# Patient Record
Sex: MALE | Race: WHITE | NOT HISPANIC OR LATINO
[De-identification: names, ages, dates, MRNs, and addresses within clinical notes are randomized per-mention and may not be internally consistent; named-entity substitution may affect disease eponyms.]

---

## 2017-02-02 ENCOUNTER — MESSAGE (OUTPATIENT)
Age: 82
End: 2017-02-02

## 2017-02-03 ENCOUNTER — MESSAGE (OUTPATIENT)
Age: 82
End: 2017-02-03

## 2017-03-24 ENCOUNTER — RX RENEWAL (OUTPATIENT)
Age: 82
End: 2017-03-24

## 2017-03-25 ENCOUNTER — RX RENEWAL (OUTPATIENT)
Age: 82
End: 2017-03-25

## 2017-05-08 ENCOUNTER — OTHER (OUTPATIENT)
Age: 82
End: 2017-05-08

## 2017-05-09 ENCOUNTER — APPOINTMENT (OUTPATIENT)
Dept: UROLOGY | Facility: CLINIC | Age: 82
End: 2017-05-09

## 2017-05-09 DIAGNOSIS — R33.9 RETENTION OF URINE, UNSPECIFIED: ICD-10-CM

## 2017-05-10 LAB
ALBUMIN SERPL ELPH-MCNC: 4.4 G/DL
ALP BLD-CCNC: 69 U/L
ALT SERPL-CCNC: 16 U/L
ANION GAP SERPL CALC-SCNC: 14 MMOL/L
AST SERPL-CCNC: 20 U/L
BASOPHILS # BLD AUTO: 0.02 K/UL
BASOPHILS NFR BLD AUTO: 0.2 %
BILIRUB SERPL-MCNC: 0.3 MG/DL
BUN SERPL-MCNC: 23 MG/DL
CALCIUM SERPL-MCNC: 9.5 MG/DL
CHLORIDE SERPL-SCNC: 104 MMOL/L
CO2 SERPL-SCNC: 24 MMOL/L
CREAT SERPL-MCNC: 1.09 MG/DL
EOSINOPHIL # BLD AUTO: 0.16 K/UL
EOSINOPHIL NFR BLD AUTO: 2 %
GLUCOSE SERPL-MCNC: 92 MG/DL
HCT VFR BLD CALC: 39.6 %
HGB BLD-MCNC: 13 G/DL
IMM GRANULOCYTES NFR BLD AUTO: 0.1 %
LYMPHOCYTES # BLD AUTO: 2.16 K/UL
LYMPHOCYTES NFR BLD AUTO: 26.8 %
MAN DIFF?: NORMAL
MCHC RBC-ENTMCNC: 28.8 PG
MCHC RBC-ENTMCNC: 32.8 GM/DL
MCV RBC AUTO: 87.6 FL
MONOCYTES # BLD AUTO: 0.61 K/UL
MONOCYTES NFR BLD AUTO: 7.6 %
NEUTROPHILS # BLD AUTO: 5.1 K/UL
NEUTROPHILS NFR BLD AUTO: 63.3 %
PLATELET # BLD AUTO: 151 K/UL
POTASSIUM SERPL-SCNC: 4.3 MMOL/L
PROT SERPL-MCNC: 6.6 G/DL
PSA SERPL-MCNC: 0.96 NG/ML
RBC # BLD: 4.52 M/UL
RBC # FLD: 14.6 %
SODIUM SERPL-SCNC: 142 MMOL/L
TESTOST SERPL-MCNC: 485 NG/DL
WBC # FLD AUTO: 8.06 K/UL

## 2017-05-21 ENCOUNTER — FORM ENCOUNTER (OUTPATIENT)
Age: 82
End: 2017-05-21

## 2017-05-22 ENCOUNTER — APPOINTMENT (OUTPATIENT)
Dept: ULTRASOUND IMAGING | Facility: CLINIC | Age: 82
End: 2017-05-22

## 2017-05-22 ENCOUNTER — MESSAGE (OUTPATIENT)
Age: 82
End: 2017-05-22

## 2017-05-22 ENCOUNTER — OUTPATIENT (OUTPATIENT)
Dept: OUTPATIENT SERVICES | Facility: HOSPITAL | Age: 82
LOS: 1 days | End: 2017-05-22
Payer: MEDICARE

## 2017-05-22 DIAGNOSIS — N18.3 CHRONIC KIDNEY DISEASE, STAGE 3 (MODERATE): ICD-10-CM

## 2017-05-22 PROCEDURE — 76536 US EXAM OF HEAD AND NECK: CPT

## 2017-06-07 ENCOUNTER — APPOINTMENT (OUTPATIENT)
Dept: INTERNAL MEDICINE | Facility: CLINIC | Age: 82
End: 2017-06-07

## 2017-06-07 VITALS
TEMPERATURE: 97.9 F | OXYGEN SATURATION: 96 % | HEART RATE: 59 BPM | SYSTOLIC BLOOD PRESSURE: 132 MMHG | WEIGHT: 189 LBS | DIASTOLIC BLOOD PRESSURE: 80 MMHG | BODY MASS INDEX: 25.6 KG/M2 | RESPIRATION RATE: 17 BRPM | HEIGHT: 72 IN

## 2017-06-07 RX ORDER — CEPHALEXIN 500 MG/1
500 CAPSULE ORAL
Qty: 14 | Refills: 0 | Status: COMPLETED | COMMUNITY
Start: 2017-03-10

## 2017-06-08 LAB
ALBUMIN SERPL ELPH-MCNC: 4.3 G/DL
ALP BLD-CCNC: 73 U/L
ALT SERPL-CCNC: 17 U/L
ANION GAP SERPL CALC-SCNC: 11 MMOL/L
AST SERPL-CCNC: 17 U/L
BILIRUB SERPL-MCNC: 0.6 MG/DL
BUN SERPL-MCNC: 26 MG/DL
CALCIUM SERPL-MCNC: 9.8 MG/DL
CHLORIDE SERPL-SCNC: 105 MMOL/L
CHOLEST SERPL-MCNC: 153 MG/DL
CHOLEST/HDLC SERPL: 3.5 RATIO
CK SERPL-CCNC: 52 U/L
CO2 SERPL-SCNC: 24 MMOL/L
CREAT SERPL-MCNC: 1.24 MG/DL
GLUCOSE SERPL-MCNC: 92 MG/DL
HDLC SERPL-MCNC: 44 MG/DL
LDLC SERPL CALC-MCNC: 92 MG/DL
POTASSIUM SERPL-SCNC: 4 MMOL/L
PROT SERPL-MCNC: 6.8 G/DL
SODIUM SERPL-SCNC: 140 MMOL/L
TRIGL SERPL-MCNC: 86 MG/DL

## 2017-06-14 ENCOUNTER — RX RENEWAL (OUTPATIENT)
Age: 82
End: 2017-06-14

## 2017-09-07 ENCOUNTER — RX RENEWAL (OUTPATIENT)
Age: 82
End: 2017-09-07

## 2017-09-11 ENCOUNTER — RX RENEWAL (OUTPATIENT)
Age: 82
End: 2017-09-11

## 2017-11-29 ENCOUNTER — MESSAGE (OUTPATIENT)
Age: 82
End: 2017-11-29

## 2017-12-11 ENCOUNTER — RX RENEWAL (OUTPATIENT)
Age: 82
End: 2017-12-11

## 2017-12-14 ENCOUNTER — MEDICATION RENEWAL (OUTPATIENT)
Age: 82
End: 2017-12-14

## 2017-12-19 ENCOUNTER — APPOINTMENT (OUTPATIENT)
Dept: UROLOGY | Facility: CLINIC | Age: 82
End: 2017-12-19
Payer: MEDICARE

## 2017-12-19 LAB
ALBUMIN SERPL ELPH-MCNC: 4.3 G/DL
ALP BLD-CCNC: 68 U/L
ALT SERPL-CCNC: 12 U/L
ANION GAP SERPL CALC-SCNC: 13 MMOL/L
APPEARANCE: CLEAR
AST SERPL-CCNC: 18 U/L
BACTERIA: NEGATIVE
BILIRUB SERPL-MCNC: 0.3 MG/DL
BILIRUBIN URINE: NEGATIVE
BLOOD URINE: NEGATIVE
BUN SERPL-MCNC: 27 MG/DL
CALCIUM SERPL-MCNC: 9.3 MG/DL
CHLORIDE SERPL-SCNC: 107 MMOL/L
CO2 SERPL-SCNC: 24 MMOL/L
COLOR: YELLOW
CREAT SERPL-MCNC: 1.14 MG/DL
GLUCOSE QUALITATIVE U: NEGATIVE MG/DL
GLUCOSE SERPL-MCNC: 86 MG/DL
HYALINE CASTS: 2 /LPF
KETONES URINE: NEGATIVE
LEUKOCYTE ESTERASE URINE: NEGATIVE
MICROSCOPIC-UA: NORMAL
NITRITE URINE: NEGATIVE
PH URINE: 5
POTASSIUM SERPL-SCNC: 4.4 MMOL/L
PROT SERPL-MCNC: 6.7 G/DL
PROTEIN URINE: NEGATIVE MG/DL
RED BLOOD CELLS URINE: 3 /HPF
SODIUM SERPL-SCNC: 144 MMOL/L
SPECIFIC GRAVITY URINE: 1.02
SQUAMOUS EPITHELIAL CELLS: 0 /HPF
UROBILINOGEN URINE: NEGATIVE MG/DL
WHITE BLOOD CELLS URINE: 1 /HPF

## 2017-12-19 PROCEDURE — 99214 OFFICE O/P EST MOD 30 MIN: CPT

## 2017-12-21 LAB
BACTERIA UR CULT: NORMAL
BASOPHILS # BLD AUTO: 0.03 K/UL
BASOPHILS NFR BLD AUTO: 0.4 %
CHOLEST SERPL-MCNC: 153 MG/DL
CHOLEST/HDLC SERPL: 3.5 RATIO
CORE LAB FLUID CYTOLOGY: NORMAL
EOSINOPHIL # BLD AUTO: 0.17 K/UL
EOSINOPHIL NFR BLD AUTO: 2.2 %
HCT VFR BLD CALC: 40.5 %
HDLC SERPL-MCNC: 44 MG/DL
HGB BLD-MCNC: 12.7 G/DL
IMM GRANULOCYTES NFR BLD AUTO: 0.3 %
LDLC SERPL CALC-MCNC: 87 MG/DL
LYMPHOCYTES # BLD AUTO: 1.95 K/UL
LYMPHOCYTES NFR BLD AUTO: 24.7 %
MAN DIFF?: NORMAL
MCHC RBC-ENTMCNC: 27.8 PG
MCHC RBC-ENTMCNC: 31.4 GM/DL
MCV RBC AUTO: 88.6 FL
MONOCYTES # BLD AUTO: 0.71 K/UL
MONOCYTES NFR BLD AUTO: 9 %
NEUTROPHILS # BLD AUTO: 5 K/UL
NEUTROPHILS NFR BLD AUTO: 63.4 %
PLATELET # BLD AUTO: 167 K/UL
PSA FREE FLD-MCNC: 27.3
PSA FREE SERPL-MCNC: 0.27 NG/ML
PSA SERPL-MCNC: 0.99 NG/ML
RBC # BLD: 4.57 M/UL
RBC # FLD: 14.9 %
TESTOST SERPL-MCNC: 390.1 NG/DL
TRIGL SERPL-MCNC: 112 MG/DL
WBC # FLD AUTO: 7.88 K/UL

## 2018-01-03 ENCOUNTER — MEDICATION RENEWAL (OUTPATIENT)
Age: 83
End: 2018-01-03

## 2018-01-03 ENCOUNTER — MESSAGE (OUTPATIENT)
Age: 83
End: 2018-01-03

## 2018-03-14 ENCOUNTER — RX RENEWAL (OUTPATIENT)
Age: 83
End: 2018-03-14

## 2018-03-27 ENCOUNTER — MESSAGE (OUTPATIENT)
Age: 83
End: 2018-03-27

## 2018-03-29 ENCOUNTER — MESSAGE (OUTPATIENT)
Age: 83
End: 2018-03-29

## 2018-04-25 ENCOUNTER — APPOINTMENT (OUTPATIENT)
Dept: UROLOGY | Facility: CLINIC | Age: 83
End: 2018-04-25
Payer: MEDICARE

## 2018-04-25 PROCEDURE — 99215 OFFICE O/P EST HI 40 MIN: CPT

## 2018-04-26 LAB
ALBUMIN SERPL ELPH-MCNC: 4.3 G/DL
ALP BLD-CCNC: 73 U/L
ALT SERPL-CCNC: 18 U/L
ANION GAP SERPL CALC-SCNC: 15 MMOL/L
APPEARANCE: CLEAR
AST SERPL-CCNC: 21 U/L
BACTERIA: NEGATIVE
BASOPHILS # BLD AUTO: 0.02 K/UL
BASOPHILS NFR BLD AUTO: 0.3 %
BILIRUB SERPL-MCNC: 0.5 MG/DL
BILIRUBIN URINE: NEGATIVE
BLOOD URINE: ABNORMAL
BUN SERPL-MCNC: 27 MG/DL
CALCIUM SERPL-MCNC: 9.7 MG/DL
CHLORIDE SERPL-SCNC: 104 MMOL/L
CHOLEST SERPL-MCNC: 161 MG/DL
CHOLEST/HDLC SERPL: 3.9 RATIO
CO2 SERPL-SCNC: 24 MMOL/L
COLOR: YELLOW
CREAT SERPL-MCNC: 1.23 MG/DL
EOSINOPHIL # BLD AUTO: 0.12 K/UL
EOSINOPHIL NFR BLD AUTO: 1.7 %
GLUCOSE QUALITATIVE U: NEGATIVE MG/DL
GLUCOSE SERPL-MCNC: 88 MG/DL
HCT VFR BLD CALC: 40.2 %
HDLC SERPL-MCNC: 41 MG/DL
HGB BLD-MCNC: 13.3 G/DL
HYALINE CASTS: 0 /LPF
IMM GRANULOCYTES NFR BLD AUTO: 0.1 %
KETONES URINE: NEGATIVE
LDLC SERPL CALC-MCNC: 89 MG/DL
LEUKOCYTE ESTERASE URINE: NEGATIVE
LYMPHOCYTES # BLD AUTO: 1.55 K/UL
LYMPHOCYTES NFR BLD AUTO: 22.3 %
MAN DIFF?: NORMAL
MCHC RBC-ENTMCNC: 28.7 PG
MCHC RBC-ENTMCNC: 33.1 GM/DL
MCV RBC AUTO: 86.6 FL
MICROSCOPIC-UA: NORMAL
MONOCYTES # BLD AUTO: 0.72 K/UL
MONOCYTES NFR BLD AUTO: 10.4 %
NEUTROPHILS # BLD AUTO: 4.52 K/UL
NEUTROPHILS NFR BLD AUTO: 65.2 %
NITRITE URINE: NEGATIVE
PH URINE: 5.5
PLATELET # BLD AUTO: 138 K/UL
POTASSIUM SERPL-SCNC: 4.8 MMOL/L
PROT SERPL-MCNC: 6.9 G/DL
PROTEIN URINE: NEGATIVE MG/DL
PSA FREE FLD-MCNC: 25.8
PSA FREE SERPL-MCNC: 0.25 NG/ML
PSA SERPL-MCNC: 0.96 NG/ML
PSA SERPL-MCNC: 0.97 NG/ML
RBC # BLD: 4.64 M/UL
RBC # FLD: 14.6 %
RED BLOOD CELLS URINE: 3 /HPF
SODIUM SERPL-SCNC: 143 MMOL/L
SPECIFIC GRAVITY URINE: 1.02
SQUAMOUS EPITHELIAL CELLS: 0 /HPF
TESTOST SERPL-MCNC: 427.3 NG/DL
TRIGL SERPL-MCNC: 157 MG/DL
UROBILINOGEN URINE: NEGATIVE MG/DL
WBC # FLD AUTO: 6.94 K/UL
WHITE BLOOD CELLS URINE: 0 /HPF

## 2018-04-28 LAB
BACTERIA UR CULT: NORMAL
CORE LAB FLUID CYTOLOGY: NORMAL

## 2018-05-08 ENCOUNTER — APPOINTMENT (OUTPATIENT)
Dept: INTERNAL MEDICINE | Facility: CLINIC | Age: 83
End: 2018-05-08
Payer: MEDICARE

## 2018-05-08 VITALS
BODY MASS INDEX: 26.28 KG/M2 | HEART RATE: 81 BPM | TEMPERATURE: 97.9 F | SYSTOLIC BLOOD PRESSURE: 119 MMHG | RESPIRATION RATE: 16 BRPM | HEIGHT: 72 IN | DIASTOLIC BLOOD PRESSURE: 74 MMHG | OXYGEN SATURATION: 97 % | WEIGHT: 194 LBS

## 2018-05-08 DIAGNOSIS — Z00.00 ENCOUNTER FOR GENERAL ADULT MEDICAL EXAMINATION W/OUT ABNORMAL FINDINGS: ICD-10-CM

## 2018-05-08 PROCEDURE — G0439: CPT

## 2018-05-17 LAB
ALBUMIN SERPL ELPH-MCNC: 4.1 G/DL
ALP BLD-CCNC: 70 U/L
ALT SERPL-CCNC: 20 U/L
ANION GAP SERPL CALC-SCNC: 11 MMOL/L
APPEARANCE: CLEAR
AST SERPL-CCNC: 21 U/L
BASOPHILS # BLD AUTO: 0.03 K/UL
BASOPHILS NFR BLD AUTO: 0.4 %
BILIRUB SERPL-MCNC: 0.5 MG/DL
BILIRUBIN URINE: NEGATIVE
BLOOD URINE: NEGATIVE
BUN SERPL-MCNC: 26 MG/DL
CALCIUM SERPL-MCNC: 9.8 MG/DL
CHLORIDE SERPL-SCNC: 108 MMOL/L
CHOLEST SERPL-MCNC: 158 MG/DL
CHOLEST/HDLC SERPL: 3.5 RATIO
CO2 SERPL-SCNC: 24 MMOL/L
COLOR: YELLOW
CREAT SERPL-MCNC: 1.21 MG/DL
EOSINOPHIL # BLD AUTO: 0.14 K/UL
EOSINOPHIL NFR BLD AUTO: 1.9 %
GLUCOSE QUALITATIVE U: NEGATIVE MG/DL
GLUCOSE SERPL-MCNC: 94 MG/DL
HBA1C MFR BLD HPLC: 5.4 %
HCT VFR BLD CALC: 40.2 %
HCV AB SER QL: NONREACTIVE
HCV S/CO RATIO: 0.09 S/CO
HDLC SERPL-MCNC: 45 MG/DL
HGB BLD-MCNC: 12.9 G/DL
IMM GRANULOCYTES NFR BLD AUTO: 0.1 %
KETONES URINE: NEGATIVE
LDLC SERPL CALC-MCNC: 93 MG/DL
LEUKOCYTE ESTERASE URINE: NEGATIVE
LYMPHOCYTES # BLD AUTO: 2.3 K/UL
LYMPHOCYTES NFR BLD AUTO: 31.8 %
MAN DIFF?: NORMAL
MCHC RBC-ENTMCNC: 27.8 PG
MCHC RBC-ENTMCNC: 32.1 GM/DL
MCV RBC AUTO: 86.6 FL
MONOCYTES # BLD AUTO: 0.55 K/UL
MONOCYTES NFR BLD AUTO: 7.6 %
NEUTROPHILS # BLD AUTO: 4.21 K/UL
NEUTROPHILS NFR BLD AUTO: 58.2 %
NITRITE URINE: NEGATIVE
PH URINE: 5.5
PLATELET # BLD AUTO: 154 K/UL
POTASSIUM SERPL-SCNC: 4.3 MMOL/L
PROT SERPL-MCNC: 6.6 G/DL
PROTEIN URINE: NEGATIVE MG/DL
RBC # BLD: 4.64 M/UL
RBC # FLD: 14.9 %
SODIUM SERPL-SCNC: 143 MMOL/L
SPECIFIC GRAVITY URINE: 1.02
TRIGL SERPL-MCNC: 100 MG/DL
TSH SERPL-ACNC: 6.82 UIU/ML
UROBILINOGEN URINE: NEGATIVE MG/DL
WBC # FLD AUTO: 7.24 K/UL

## 2018-06-12 ENCOUNTER — RX RENEWAL (OUTPATIENT)
Age: 83
End: 2018-06-12

## 2018-06-22 ENCOUNTER — RX RENEWAL (OUTPATIENT)
Age: 83
End: 2018-06-22

## 2018-09-18 ENCOUNTER — RX RENEWAL (OUTPATIENT)
Age: 83
End: 2018-09-18

## 2018-09-21 ENCOUNTER — RX RENEWAL (OUTPATIENT)
Age: 83
End: 2018-09-21

## 2018-10-12 ENCOUNTER — MESSAGE (OUTPATIENT)
Age: 83
End: 2018-10-12

## 2018-10-31 ENCOUNTER — APPOINTMENT (OUTPATIENT)
Dept: UROLOGY | Facility: CLINIC | Age: 83
End: 2018-10-31
Payer: MEDICARE

## 2018-10-31 DIAGNOSIS — R36.1 HEMATOSPERMIA: ICD-10-CM

## 2018-10-31 DIAGNOSIS — R35.0 FREQUENCY OF MICTURITION: ICD-10-CM

## 2018-10-31 PROCEDURE — 99214 OFFICE O/P EST MOD 30 MIN: CPT

## 2018-11-01 ENCOUNTER — APPOINTMENT (OUTPATIENT)
Dept: UROLOGY | Facility: CLINIC | Age: 83
End: 2018-11-01

## 2018-11-01 LAB
APPEARANCE: CLEAR
BACTERIA: NEGATIVE
BILIRUBIN URINE: NEGATIVE
BLOOD URINE: ABNORMAL
COLOR: YELLOW
GLUCOSE QUALITATIVE U: NEGATIVE MG/DL
HYALINE CASTS: 0 /LPF
KETONES URINE: NEGATIVE
LEUKOCYTE ESTERASE URINE: NEGATIVE
MICROSCOPIC-UA: NORMAL
NITRITE URINE: NEGATIVE
PH URINE: 5.5
PROTEIN URINE: NEGATIVE MG/DL
RED BLOOD CELLS URINE: 1 /HPF
SPECIFIC GRAVITY URINE: 1.02
SQUAMOUS EPITHELIAL CELLS: 0 /HPF
UROBILINOGEN URINE: NEGATIVE MG/DL
WHITE BLOOD CELLS URINE: 1 /HPF

## 2018-11-02 LAB — BACTERIA UR CULT: NORMAL

## 2018-11-03 LAB — CORE LAB FLUID CYTOLOGY: NORMAL

## 2018-11-27 ENCOUNTER — MESSAGE (OUTPATIENT)
Age: 83
End: 2018-11-27

## 2018-12-10 ENCOUNTER — MEDICATION RENEWAL (OUTPATIENT)
Age: 83
End: 2018-12-10

## 2018-12-10 ENCOUNTER — RX RENEWAL (OUTPATIENT)
Age: 83
End: 2018-12-10

## 2018-12-13 ENCOUNTER — MOBILE ON CALL (OUTPATIENT)
Age: 83
End: 2018-12-13

## 2018-12-13 ENCOUNTER — FORM ENCOUNTER (OUTPATIENT)
Age: 83
End: 2018-12-13

## 2018-12-14 ENCOUNTER — APPOINTMENT (OUTPATIENT)
Dept: UROLOGY | Facility: CLINIC | Age: 83
End: 2018-12-14
Payer: MEDICARE

## 2018-12-14 ENCOUNTER — OUTPATIENT (OUTPATIENT)
Dept: OUTPATIENT SERVICES | Facility: HOSPITAL | Age: 83
LOS: 1 days | End: 2018-12-14
Payer: MEDICARE

## 2018-12-14 ENCOUNTER — APPOINTMENT (OUTPATIENT)
Dept: CT IMAGING | Facility: IMAGING CENTER | Age: 83
End: 2018-12-14
Payer: MEDICARE

## 2018-12-14 VITALS
TEMPERATURE: 97.9 F | RESPIRATION RATE: 18 BRPM | HEART RATE: 80 BPM | SYSTOLIC BLOOD PRESSURE: 131 MMHG | DIASTOLIC BLOOD PRESSURE: 81 MMHG

## 2018-12-14 DIAGNOSIS — N40.1 BENIGN PROSTATIC HYPERPLASIA WITH LOWER URINARY TRACT SYMPTOMS: ICD-10-CM

## 2018-12-14 PROCEDURE — 99213 OFFICE O/P EST LOW 20 MIN: CPT

## 2018-12-14 PROCEDURE — 74178 CT ABD&PLV WO CNTR FLWD CNTR: CPT | Mod: 26

## 2018-12-14 PROCEDURE — 74178 CT ABD&PLV WO CNTR FLWD CNTR: CPT

## 2018-12-14 PROCEDURE — 82565 ASSAY OF CREATININE: CPT

## 2018-12-17 ENCOUNTER — APPOINTMENT (OUTPATIENT)
Dept: SURGICAL ONCOLOGY | Facility: CLINIC | Age: 83
End: 2018-12-17
Payer: MEDICARE

## 2018-12-17 ENCOUNTER — OUTPATIENT (OUTPATIENT)
Dept: OUTPATIENT SERVICES | Facility: HOSPITAL | Age: 83
LOS: 1 days | End: 2018-12-17
Payer: MEDICARE

## 2018-12-17 ENCOUNTER — APPOINTMENT (OUTPATIENT)
Dept: UROLOGY | Facility: CLINIC | Age: 83
End: 2018-12-17
Payer: MEDICARE

## 2018-12-17 VITALS
SYSTOLIC BLOOD PRESSURE: 170 MMHG | BODY MASS INDEX: 24.52 KG/M2 | HEIGHT: 72 IN | DIASTOLIC BLOOD PRESSURE: 83 MMHG | RESPIRATION RATE: 16 BRPM | HEART RATE: 66 BPM | WEIGHT: 181 LBS | OXYGEN SATURATION: 96 %

## 2018-12-17 VITALS
DIASTOLIC BLOOD PRESSURE: 84 MMHG | RESPIRATION RATE: 17 BRPM | HEART RATE: 76 BPM | SYSTOLIC BLOOD PRESSURE: 144 MMHG | TEMPERATURE: 97.9 F

## 2018-12-17 DIAGNOSIS — R35.0 FREQUENCY OF MICTURITION: ICD-10-CM

## 2018-12-17 DIAGNOSIS — Z12.11 ENCOUNTER FOR SCREENING FOR MALIGNANT NEOPLASM OF COLON: ICD-10-CM

## 2018-12-17 PROCEDURE — 52000 CYSTOURETHROSCOPY: CPT

## 2018-12-17 PROCEDURE — 99213 OFFICE O/P EST LOW 20 MIN: CPT

## 2018-12-18 LAB
APPEARANCE: ABNORMAL
BACTERIA UR CULT: NORMAL
BACTERIA: NEGATIVE
BILIRUBIN URINE: NEGATIVE
BLOOD URINE: ABNORMAL
COLOR: ABNORMAL
GLUCOSE QUALITATIVE U: NEGATIVE MG/DL
HYALINE CASTS: 1 /LPF
KETONES URINE: NEGATIVE
LEUKOCYTE ESTERASE URINE: ABNORMAL
MICROSCOPIC-UA: NORMAL
NITRITE URINE: NEGATIVE
PH URINE: 5.5
PROTEIN URINE: 100 MG/DL
PSA SERPL-MCNC: 0.89 NG/ML
RED BLOOD CELLS URINE: >720 /HPF
SPECIFIC GRAVITY URINE: 1.02
SQUAMOUS EPITHELIAL CELLS: 0 /HPF
UROBILINOGEN URINE: NEGATIVE MG/DL
WHITE BLOOD CELLS URINE: 4 /HPF

## 2018-12-19 ENCOUNTER — APPOINTMENT (OUTPATIENT)
Dept: UROLOGY | Facility: CLINIC | Age: 83
End: 2018-12-19
Payer: MEDICARE

## 2018-12-19 DIAGNOSIS — R31.0 GROSS HEMATURIA: ICD-10-CM

## 2018-12-19 DIAGNOSIS — N40.1 BENIGN PROSTATIC HYPERPLASIA WITH LOWER URINARY TRACT SYMPTOMS: ICD-10-CM

## 2018-12-19 PROCEDURE — 51798 US URINE CAPACITY MEASURE: CPT

## 2018-12-19 PROCEDURE — 99213 OFFICE O/P EST LOW 20 MIN: CPT | Mod: 25

## 2018-12-26 ENCOUNTER — OUTPATIENT (OUTPATIENT)
Dept: OUTPATIENT SERVICES | Facility: HOSPITAL | Age: 83
LOS: 1 days | End: 2018-12-26
Payer: MEDICARE

## 2018-12-26 VITALS
HEART RATE: 62 BPM | RESPIRATION RATE: 16 BRPM | WEIGHT: 177.69 LBS | TEMPERATURE: 97 F | OXYGEN SATURATION: 98 % | SYSTOLIC BLOOD PRESSURE: 131 MMHG | DIASTOLIC BLOOD PRESSURE: 78 MMHG | HEIGHT: 72 IN

## 2018-12-26 DIAGNOSIS — N40.0 BENIGN PROSTATIC HYPERPLASIA WITHOUT LOWER URINARY TRACT SYMPTOMS: ICD-10-CM

## 2018-12-26 DIAGNOSIS — Z98.890 OTHER SPECIFIED POSTPROCEDURAL STATES: Chronic | ICD-10-CM

## 2018-12-26 DIAGNOSIS — Z98.49 CATARACT EXTRACTION STATUS, UNSPECIFIED EYE: Chronic | ICD-10-CM

## 2018-12-26 DIAGNOSIS — T14.8XXA OTHER INJURY OF UNSPECIFIED BODY REGION, INITIAL ENCOUNTER: Chronic | ICD-10-CM

## 2018-12-26 DIAGNOSIS — N40.1 BENIGN PROSTATIC HYPERPLASIA WITH LOWER URINARY TRACT SYMPTOMS: ICD-10-CM

## 2018-12-26 DIAGNOSIS — Z94.7 CORNEAL TRANSPLANT STATUS: Chronic | ICD-10-CM

## 2018-12-26 DIAGNOSIS — Z01.818 ENCOUNTER FOR OTHER PREPROCEDURAL EXAMINATION: ICD-10-CM

## 2018-12-26 DIAGNOSIS — Z90.49 ACQUIRED ABSENCE OF OTHER SPECIFIED PARTS OF DIGESTIVE TRACT: Chronic | ICD-10-CM

## 2018-12-26 DIAGNOSIS — Z90.89 ACQUIRED ABSENCE OF OTHER ORGANS: Chronic | ICD-10-CM

## 2018-12-26 LAB
ANION GAP SERPL CALC-SCNC: 11 MMOL/L — SIGNIFICANT CHANGE UP (ref 5–17)
BLD GP AB SCN SERPL QL: NEGATIVE — SIGNIFICANT CHANGE UP
BUN SERPL-MCNC: 22 MG/DL — SIGNIFICANT CHANGE UP (ref 7–23)
CALCIUM SERPL-MCNC: 9.9 MG/DL — SIGNIFICANT CHANGE UP (ref 8.4–10.5)
CHLORIDE SERPL-SCNC: 104 MMOL/L — SIGNIFICANT CHANGE UP (ref 96–108)
CO2 SERPL-SCNC: 26 MMOL/L — SIGNIFICANT CHANGE UP (ref 22–31)
CREAT SERPL-MCNC: 1.21 MG/DL — SIGNIFICANT CHANGE UP (ref 0.5–1.3)
GLUCOSE SERPL-MCNC: 92 MG/DL — SIGNIFICANT CHANGE UP (ref 70–99)
HCT VFR BLD CALC: 38.7 % — LOW (ref 39–50)
HGB BLD-MCNC: 12 G/DL — LOW (ref 13–17)
MCHC RBC-ENTMCNC: 27.6 PG — SIGNIFICANT CHANGE UP (ref 27–34)
MCHC RBC-ENTMCNC: 31 GM/DL — LOW (ref 32–36)
MCV RBC AUTO: 89.2 FL — SIGNIFICANT CHANGE UP (ref 80–100)
PLATELET # BLD AUTO: 157 K/UL — SIGNIFICANT CHANGE UP (ref 150–400)
POTASSIUM SERPL-MCNC: 3.8 MMOL/L — SIGNIFICANT CHANGE UP (ref 3.5–5.3)
POTASSIUM SERPL-SCNC: 3.8 MMOL/L — SIGNIFICANT CHANGE UP (ref 3.5–5.3)
RBC # BLD: 4.34 M/UL — SIGNIFICANT CHANGE UP (ref 4.2–5.8)
RBC # FLD: 14.8 % — HIGH (ref 10.3–14.5)
RH IG SCN BLD-IMP: POSITIVE — SIGNIFICANT CHANGE UP
SODIUM SERPL-SCNC: 141 MMOL/L — SIGNIFICANT CHANGE UP (ref 135–145)
WBC # BLD: 7.13 K/UL — SIGNIFICANT CHANGE UP (ref 3.8–10.5)
WBC # FLD AUTO: 7.13 K/UL — SIGNIFICANT CHANGE UP (ref 3.8–10.5)

## 2018-12-26 PROCEDURE — 86850 RBC ANTIBODY SCREEN: CPT

## 2018-12-26 PROCEDURE — 86901 BLOOD TYPING SEROLOGIC RH(D): CPT

## 2018-12-26 PROCEDURE — G0463: CPT

## 2018-12-26 PROCEDURE — 85027 COMPLETE CBC AUTOMATED: CPT

## 2018-12-26 PROCEDURE — 86900 BLOOD TYPING SEROLOGIC ABO: CPT

## 2018-12-26 PROCEDURE — 80048 BASIC METABOLIC PNL TOTAL CA: CPT

## 2018-12-26 RX ORDER — CEFAZOLIN SODIUM 1 G
2000 VIAL (EA) INJECTION ONCE
Qty: 0 | Refills: 0 | Status: DISCONTINUED | OUTPATIENT
Start: 2019-01-03 | End: 2019-01-03

## 2018-12-26 RX ORDER — LIDOCAINE HCL 20 MG/ML
0.2 VIAL (ML) INJECTION ONCE
Qty: 0 | Refills: 0 | Status: DISCONTINUED | OUTPATIENT
Start: 2019-01-03 | End: 2019-01-03

## 2018-12-26 RX ORDER — SODIUM CHLORIDE 9 MG/ML
3 INJECTION INTRAMUSCULAR; INTRAVENOUS; SUBCUTANEOUS EVERY 8 HOURS
Qty: 0 | Refills: 0 | Status: DISCONTINUED | OUTPATIENT
Start: 2019-01-03 | End: 2019-01-03

## 2018-12-26 NOTE — H&P PST ADULT - ACTIVITY
Plays golf 3 times a week (lives in [place]), gym 3 times a week - treadmill for 20 mts. Plays golf 3 times a week (lives in [place]), gym 2 times a week - treadmill for 20 mts.

## 2018-12-26 NOTE — H&P PST ADULT - PROBLEM SELECTOR PLAN 1
Laser Enucleation of Prostate, Possible Transurethral Resection of Bladder Tumor  Pre-op education provided - all questions answered

## 2018-12-26 NOTE — H&P PST ADULT - PSH
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

## 2018-12-26 NOTE — H&P PST ADULT - PMH
BPH (benign prostatic hyperplasia)    Colon cancer  s/p colon resection 1988  Cornea disorder  s/p bilateral partial transplant 2014  Heart murmur  benign - no symptoms  HLD (hyperlipidemia) BPH (benign prostatic hyperplasia)    Colon cancer  s/p colon resection 1988  Fuchs' corneal dystrophy    Heart murmur  benign - no symptoms  HLD (hyperlipidemia)

## 2018-12-26 NOTE — H&P PST ADULT - HISTORY OF PRESENT ILLNESS
85 yo male, PMH HLD, colon CA s/p colon resection 1988, fuchs dystrophy s/p partial corneal transplant 2014. Pt. reports hematuria on 12/12/18, saw urologist, in-office cystoscopy revealed enlarged prostate. Pt. presents to PST today for scheduled Laser Enucleation of Prostate, Possible transurethral resection of bladder tumor on 1/3/19. Pt. continues with urinary frequency's denies any further hematuria, fever, chills, chest pain, SOB, changes in bowel habits.  Urine culture from 12/14/18 in chart - <10,000 normal urogenital riddhi

## 2018-12-27 PROBLEM — Z01.818 PREOPERATIVE EXAMINATION: Status: ACTIVE | Noted: 2018-12-27

## 2018-12-27 PROBLEM — L21.9 SEBORRHEA: Status: ACTIVE | Noted: 2018-01-03

## 2018-12-28 ENCOUNTER — NON-APPOINTMENT (OUTPATIENT)
Age: 83
End: 2018-12-28

## 2018-12-28 ENCOUNTER — APPOINTMENT (OUTPATIENT)
Dept: INTERNAL MEDICINE | Facility: CLINIC | Age: 83
End: 2018-12-28
Payer: MEDICARE

## 2018-12-28 VITALS
RESPIRATION RATE: 17 BRPM | SYSTOLIC BLOOD PRESSURE: 125 MMHG | TEMPERATURE: 98 F | BODY MASS INDEX: 24.52 KG/M2 | HEART RATE: 76 BPM | DIASTOLIC BLOOD PRESSURE: 80 MMHG | OXYGEN SATURATION: 96 % | WEIGHT: 181 LBS | HEIGHT: 72 IN

## 2018-12-28 DIAGNOSIS — R31.29 OTHER MICROSCOPIC HEMATURIA: ICD-10-CM

## 2018-12-28 DIAGNOSIS — R79.89 OTHER SPECIFIED ABNORMAL FINDINGS OF BLOOD CHEMISTRY: ICD-10-CM

## 2018-12-28 DIAGNOSIS — M06.9 RHEUMATOID ARTHRITIS, UNSPECIFIED: ICD-10-CM

## 2018-12-28 DIAGNOSIS — R00.1 BRADYCARDIA, UNSPECIFIED: ICD-10-CM

## 2018-12-28 DIAGNOSIS — M65.352 TRIGGER FINGER, LEFT LITTLE FINGER: ICD-10-CM

## 2018-12-28 DIAGNOSIS — Z01.818 ENCOUNTER FOR OTHER PREPROCEDURAL EXAMINATION: ICD-10-CM

## 2018-12-28 DIAGNOSIS — L21.9 SEBORRHEIC DERMATITIS, UNSPECIFIED: ICD-10-CM

## 2018-12-28 DIAGNOSIS — R97.20 ELEVATED PROSTATE, SPECIFIC ANTIGEN [PSA]: ICD-10-CM

## 2018-12-28 DIAGNOSIS — E78.00 PURE HYPERCHOLESTEROLEMIA, UNSPECIFIED: ICD-10-CM

## 2018-12-28 DIAGNOSIS — R01.1 CARDIAC MURMUR, UNSPECIFIED: ICD-10-CM

## 2018-12-28 DIAGNOSIS — R94.31 ABNORMAL ELECTROCARDIOGRAM [ECG] [EKG]: ICD-10-CM

## 2018-12-28 DIAGNOSIS — R06.00 DYSPNEA, UNSPECIFIED: ICD-10-CM

## 2018-12-28 DIAGNOSIS — N39.0 URINARY TRACT INFECTION, SITE NOT SPECIFIED: ICD-10-CM

## 2018-12-28 DIAGNOSIS — R31.0 GROSS HEMATURIA: ICD-10-CM

## 2018-12-28 DIAGNOSIS — D50.9 IRON DEFICIENCY ANEMIA, UNSPECIFIED: ICD-10-CM

## 2018-12-28 DIAGNOSIS — N18.3 CHRONIC KIDNEY DISEASE, STAGE 3 (MODERATE): ICD-10-CM

## 2018-12-28 DIAGNOSIS — D64.9 ANEMIA, UNSPECIFIED: ICD-10-CM

## 2018-12-28 DIAGNOSIS — N43.3 HYDROCELE, UNSPECIFIED: ICD-10-CM

## 2018-12-28 PROCEDURE — 99214 OFFICE O/P EST MOD 30 MIN: CPT | Mod: 25

## 2018-12-28 PROCEDURE — 93000 ELECTROCARDIOGRAM COMPLETE: CPT

## 2019-01-02 ENCOUNTER — TRANSCRIPTION ENCOUNTER (OUTPATIENT)
Age: 84
End: 2019-01-02

## 2019-01-03 ENCOUNTER — OUTPATIENT (OUTPATIENT)
Dept: OUTPATIENT SERVICES | Facility: HOSPITAL | Age: 84
LOS: 1 days | Discharge: HOME | End: 2019-01-03
Payer: MEDICARE

## 2019-01-03 ENCOUNTER — APPOINTMENT (OUTPATIENT)
Dept: UROLOGY | Facility: HOSPITAL | Age: 84
End: 2019-01-03

## 2019-01-03 ENCOUNTER — RESULT REVIEW (OUTPATIENT)
Age: 84
End: 2019-01-03

## 2019-01-03 VITALS
HEIGHT: 72 IN | OXYGEN SATURATION: 95 % | WEIGHT: 177.69 LBS | TEMPERATURE: 98 F | RESPIRATION RATE: 16 BRPM | SYSTOLIC BLOOD PRESSURE: 150 MMHG | HEART RATE: 64 BPM | DIASTOLIC BLOOD PRESSURE: 83 MMHG

## 2019-01-03 DIAGNOSIS — Z90.49 ACQUIRED ABSENCE OF OTHER SPECIFIED PARTS OF DIGESTIVE TRACT: Chronic | ICD-10-CM

## 2019-01-03 DIAGNOSIS — Z98.890 OTHER SPECIFIED POSTPROCEDURAL STATES: Chronic | ICD-10-CM

## 2019-01-03 DIAGNOSIS — Z90.89 ACQUIRED ABSENCE OF OTHER ORGANS: Chronic | ICD-10-CM

## 2019-01-03 DIAGNOSIS — Z98.49 CATARACT EXTRACTION STATUS, UNSPECIFIED EYE: Chronic | ICD-10-CM

## 2019-01-03 DIAGNOSIS — T14.8XXA OTHER INJURY OF UNSPECIFIED BODY REGION, INITIAL ENCOUNTER: Chronic | ICD-10-CM

## 2019-01-03 DIAGNOSIS — N40.1 BENIGN PROSTATIC HYPERPLASIA WITH LOWER URINARY TRACT SYMPTOMS: ICD-10-CM

## 2019-01-03 DIAGNOSIS — Z94.7 CORNEAL TRANSPLANT STATUS: Chronic | ICD-10-CM

## 2019-01-03 LAB
ANION GAP SERPL CALC-SCNC: 10 MMOL/L — SIGNIFICANT CHANGE UP (ref 5–17)
BASOPHILS # BLD AUTO: 0 K/UL — SIGNIFICANT CHANGE UP (ref 0–0.2)
BASOPHILS NFR BLD AUTO: 0.2 % — SIGNIFICANT CHANGE UP (ref 0–2)
BUN SERPL-MCNC: 20 MG/DL — SIGNIFICANT CHANGE UP (ref 7–23)
CALCIUM SERPL-MCNC: 8.9 MG/DL — SIGNIFICANT CHANGE UP (ref 8.4–10.5)
CHLORIDE SERPL-SCNC: 108 MMOL/L — SIGNIFICANT CHANGE UP (ref 96–108)
CO2 SERPL-SCNC: 22 MMOL/L — SIGNIFICANT CHANGE UP (ref 22–31)
CREAT SERPL-MCNC: 1.01 MG/DL — SIGNIFICANT CHANGE UP (ref 0.5–1.3)
EOSINOPHIL # BLD AUTO: 0.1 K/UL — SIGNIFICANT CHANGE UP (ref 0–0.5)
EOSINOPHIL NFR BLD AUTO: 1 % — SIGNIFICANT CHANGE UP (ref 0–6)
GLUCOSE SERPL-MCNC: 104 MG/DL — HIGH (ref 70–99)
HCT VFR BLD CALC: 33.5 % — LOW (ref 39–50)
HGB BLD-MCNC: 11.2 G/DL — LOW (ref 13–17)
LYMPHOCYTES # BLD AUTO: 1.5 K/UL — SIGNIFICANT CHANGE UP (ref 1–3.3)
LYMPHOCYTES # BLD AUTO: 20.3 % — SIGNIFICANT CHANGE UP (ref 13–44)
MCHC RBC-ENTMCNC: 29 PG — SIGNIFICANT CHANGE UP (ref 27–34)
MCHC RBC-ENTMCNC: 33.5 GM/DL — SIGNIFICANT CHANGE UP (ref 32–36)
MCV RBC AUTO: 86.7 FL — SIGNIFICANT CHANGE UP (ref 80–100)
MONOCYTES # BLD AUTO: 0.4 K/UL — SIGNIFICANT CHANGE UP (ref 0–0.9)
MONOCYTES NFR BLD AUTO: 6 % — SIGNIFICANT CHANGE UP (ref 2–14)
NEUTROPHILS # BLD AUTO: 5.2 K/UL — SIGNIFICANT CHANGE UP (ref 1.8–7.4)
NEUTROPHILS NFR BLD AUTO: 72.5 % — SIGNIFICANT CHANGE UP (ref 43–77)
PLATELET # BLD AUTO: 122 K/UL — LOW (ref 150–400)
POTASSIUM SERPL-MCNC: 3.9 MMOL/L — SIGNIFICANT CHANGE UP (ref 3.5–5.3)
POTASSIUM SERPL-SCNC: 3.9 MMOL/L — SIGNIFICANT CHANGE UP (ref 3.5–5.3)
RBC # BLD: 3.86 M/UL — LOW (ref 4.2–5.8)
RBC # FLD: 13 % — SIGNIFICANT CHANGE UP (ref 10.3–14.5)
RH IG SCN BLD-IMP: POSITIVE — SIGNIFICANT CHANGE UP
SODIUM SERPL-SCNC: 140 MMOL/L — SIGNIFICANT CHANGE UP (ref 135–145)
WBC # BLD: 7.2 K/UL — SIGNIFICANT CHANGE UP (ref 3.8–10.5)
WBC # FLD AUTO: 7.2 K/UL — SIGNIFICANT CHANGE UP (ref 3.8–10.5)

## 2019-01-03 PROCEDURE — 88305 TISSUE EXAM BY PATHOLOGIST: CPT | Mod: 26

## 2019-01-03 RX ORDER — OXYCODONE HYDROCHLORIDE 5 MG/1
10 TABLET ORAL EVERY 6 HOURS
Qty: 0 | Refills: 0 | Status: DISCONTINUED | OUTPATIENT
Start: 2019-01-03 | End: 2019-01-04

## 2019-01-03 RX ORDER — TAMSULOSIN HYDROCHLORIDE 0.4 MG/1
0.4 CAPSULE ORAL AT BEDTIME
Qty: 0 | Refills: 0 | Status: DISCONTINUED | OUTPATIENT
Start: 2019-01-03 | End: 2019-01-04

## 2019-01-03 RX ORDER — SENNA PLUS 8.6 MG/1
2 TABLET ORAL AT BEDTIME
Qty: 0 | Refills: 0 | Status: DISCONTINUED | OUTPATIENT
Start: 2019-01-03 | End: 2019-01-04

## 2019-01-03 RX ORDER — UBIDECARENONE 100 MG
100 CAPSULE ORAL
Qty: 0 | Refills: 0 | COMMUNITY

## 2019-01-03 RX ORDER — PYRIDOXINE HCL (VITAMIN B6) 100 MG
100 TABLET ORAL DAILY
Qty: 0 | Refills: 0 | Status: DISCONTINUED | OUTPATIENT
Start: 2019-01-03 | End: 2019-01-04

## 2019-01-03 RX ORDER — FINASTERIDE 5 MG/1
5 TABLET, FILM COATED ORAL DAILY
Qty: 0 | Refills: 0 | Status: DISCONTINUED | OUTPATIENT
Start: 2019-01-03 | End: 2019-01-04

## 2019-01-03 RX ORDER — ROSUVASTATIN CALCIUM 5 MG/1
1 TABLET ORAL
Qty: 0 | Refills: 0 | COMMUNITY

## 2019-01-03 RX ORDER — PREGABALIN 225 MG/1
1000 CAPSULE ORAL DAILY
Qty: 0 | Refills: 0 | Status: DISCONTINUED | OUTPATIENT
Start: 2019-01-03 | End: 2019-01-04

## 2019-01-03 RX ORDER — ATROPA BELLADONNA AND OPIUM 16.2; 6 MG/1; MG/1
1 SUPPOSITORY RECTAL ONCE
Qty: 0 | Refills: 0 | Status: DISCONTINUED | OUTPATIENT
Start: 2019-01-03 | End: 2019-01-03

## 2019-01-03 RX ORDER — HEPARIN SODIUM 5000 [USP'U]/ML
5000 INJECTION INTRAVENOUS; SUBCUTANEOUS EVERY 8 HOURS
Qty: 0 | Refills: 0 | Status: DISCONTINUED | OUTPATIENT
Start: 2019-01-03 | End: 2019-01-04

## 2019-01-03 RX ORDER — HYDROMORPHONE HYDROCHLORIDE 2 MG/ML
0.25 INJECTION INTRAMUSCULAR; INTRAVENOUS; SUBCUTANEOUS
Qty: 0 | Refills: 0 | Status: DISCONTINUED | OUTPATIENT
Start: 2019-01-03 | End: 2019-01-03

## 2019-01-03 RX ORDER — ACETAMINOPHEN 500 MG
650 TABLET ORAL EVERY 6 HOURS
Qty: 0 | Refills: 0 | Status: DISCONTINUED | OUTPATIENT
Start: 2019-01-03 | End: 2019-01-04

## 2019-01-03 RX ORDER — PREGABALIN 225 MG/1
1 CAPSULE ORAL
Qty: 0 | Refills: 0 | COMMUNITY

## 2019-01-03 RX ORDER — CHOLECALCIFEROL (VITAMIN D3) 125 MCG
25 CAPSULE ORAL
Qty: 0 | Refills: 0 | COMMUNITY

## 2019-01-03 RX ORDER — FUROSEMIDE 40 MG
10 TABLET ORAL ONCE
Qty: 0 | Refills: 0 | Status: COMPLETED | OUTPATIENT
Start: 2019-01-03 | End: 2019-01-03

## 2019-01-03 RX ORDER — PYRIDOXINE HCL (VITAMIN B6) 100 MG
1 TABLET ORAL
Qty: 0 | Refills: 0 | COMMUNITY

## 2019-01-03 RX ORDER — HEPARIN SODIUM 5000 [USP'U]/ML
5000 INJECTION INTRAVENOUS; SUBCUTANEOUS EVERY 8 HOURS
Qty: 0 | Refills: 0 | Status: DISCONTINUED | OUTPATIENT
Start: 2019-01-03 | End: 2019-01-03

## 2019-01-03 RX ORDER — DOCUSATE SODIUM 100 MG
100 CAPSULE ORAL THREE TIMES A DAY
Qty: 0 | Refills: 0 | Status: DISCONTINUED | OUTPATIENT
Start: 2019-01-03 | End: 2019-01-04

## 2019-01-03 RX ORDER — OXYCODONE HYDROCHLORIDE 5 MG/1
5 TABLET ORAL EVERY 6 HOURS
Qty: 0 | Refills: 0 | Status: DISCONTINUED | OUTPATIENT
Start: 2019-01-03 | End: 2019-01-04

## 2019-01-03 RX ORDER — TAMSULOSIN HYDROCHLORIDE 0.4 MG/1
1 CAPSULE ORAL
Qty: 0 | Refills: 0 | COMMUNITY

## 2019-01-03 RX ORDER — FUROSEMIDE 40 MG
10 TABLET ORAL ONCE
Qty: 0 | Refills: 0 | Status: COMPLETED | OUTPATIENT
Start: 2019-01-04 | End: 2019-01-04

## 2019-01-03 RX ORDER — PREDNISOLONE SODIUM PHOSPHATE 1 %
0 DROPS OPHTHALMIC (EYE)
Qty: 0 | Refills: 0 | COMMUNITY

## 2019-01-03 RX ORDER — FINASTERIDE 5 MG/1
1 TABLET, FILM COATED ORAL
Qty: 0 | Refills: 0 | COMMUNITY

## 2019-01-03 RX ORDER — ATORVASTATIN CALCIUM 80 MG/1
20 TABLET, FILM COATED ORAL AT BEDTIME
Qty: 0 | Refills: 0 | Status: DISCONTINUED | OUTPATIENT
Start: 2019-01-03 | End: 2019-01-04

## 2019-01-03 RX ORDER — CEFAZOLIN SODIUM 1 G
1000 VIAL (EA) INJECTION EVERY 8 HOURS
Qty: 0 | Refills: 0 | Status: COMPLETED | OUTPATIENT
Start: 2019-01-03 | End: 2019-01-03

## 2019-01-03 RX ORDER — SODIUM CHLORIDE 9 MG/ML
1000 INJECTION, SOLUTION INTRAVENOUS
Qty: 0 | Refills: 0 | Status: DISCONTINUED | OUTPATIENT
Start: 2019-01-03 | End: 2019-01-04

## 2019-01-03 RX ORDER — CHOLECALCIFEROL (VITAMIN D3) 125 MCG
400 CAPSULE ORAL DAILY
Qty: 0 | Refills: 0 | Status: DISCONTINUED | OUTPATIENT
Start: 2019-01-03 | End: 2019-01-04

## 2019-01-03 RX ORDER — ONDANSETRON 8 MG/1
4 TABLET, FILM COATED ORAL ONCE
Qty: 0 | Refills: 0 | Status: DISCONTINUED | OUTPATIENT
Start: 2019-01-03 | End: 2019-01-03

## 2019-01-03 RX ADMIN — Medication 100 MILLIGRAM(S): at 12:25

## 2019-01-03 RX ADMIN — TAMSULOSIN HYDROCHLORIDE 0.4 MILLIGRAM(S): 0.4 CAPSULE ORAL at 21:27

## 2019-01-03 RX ADMIN — Medication 400 UNIT(S): at 12:25

## 2019-01-03 RX ADMIN — SODIUM CHLORIDE 100 MILLILITER(S): 9 INJECTION, SOLUTION INTRAVENOUS at 21:26

## 2019-01-03 RX ADMIN — SODIUM CHLORIDE 100 MILLILITER(S): 9 INJECTION, SOLUTION INTRAVENOUS at 11:24

## 2019-01-03 RX ADMIN — Medication 100 MILLIGRAM(S): at 21:27

## 2019-01-03 RX ADMIN — Medication 10 MILLIGRAM(S): at 11:20

## 2019-01-03 RX ADMIN — Medication 100 MILLIGRAM(S): at 16:32

## 2019-01-03 RX ADMIN — SODIUM CHLORIDE 100 MILLILITER(S): 9 INJECTION, SOLUTION INTRAVENOUS at 16:34

## 2019-01-03 RX ADMIN — ATROPA BELLADONNA AND OPIUM 1 SUPPOSITORY(S): 16.2; 6 SUPPOSITORY RECTAL at 12:22

## 2019-01-03 RX ADMIN — PREGABALIN 1000 MICROGRAM(S): 225 CAPSULE ORAL at 12:25

## 2019-01-03 RX ADMIN — HEPARIN SODIUM 5000 UNIT(S): 5000 INJECTION INTRAVENOUS; SUBCUTANEOUS at 21:27

## 2019-01-03 RX ADMIN — FINASTERIDE 5 MILLIGRAM(S): 5 TABLET, FILM COATED ORAL at 21:27

## 2019-01-03 RX ADMIN — Medication 100 MILLIGRAM(S): at 14:27

## 2019-01-03 RX ADMIN — ATROPA BELLADONNA AND OPIUM 1 SUPPOSITORY(S): 16.2; 6 SUPPOSITORY RECTAL at 11:29

## 2019-01-03 RX ADMIN — HEPARIN SODIUM 5000 UNIT(S): 5000 INJECTION INTRAVENOUS; SUBCUTANEOUS at 14:28

## 2019-01-03 NOTE — BRIEF OPERATIVE NOTE - PRE-OP DX
Benign prostatic hyperplasia with lower urinary tract symptoms, symptom details unspecified  01/03/2019    Active  David Birmingham

## 2019-01-03 NOTE — PACU DISCHARGE NOTE - COMMENTS
23 hr stay.  Pt c/o trembling sensation in BLE, examined, no tremor noted on exam. Pt to inform RN/MD if continues

## 2019-01-03 NOTE — BRIEF OPERATIVE NOTE - PROCEDURE
<<-----Click on this checkbox to enter Procedure Enucleation of prostate using holmium laser  01/03/2019  cystoscopy, bladder biopsy  Active  DNETHALA

## 2019-01-03 NOTE — PRE-ANESTHESIA EVALUATION ADULT - NSANTHADDINFOFT_GEN_ALL_CORE
Pre-op medical evaluation and optimization noted and appreciated. Discussed GA with patient in detail and all questions sought and answered. Pt. agrees to all plans and wishes to proceed.

## 2019-01-04 ENCOUNTER — TRANSCRIPTION ENCOUNTER (OUTPATIENT)
Age: 84
End: 2019-01-04

## 2019-01-04 VITALS
HEART RATE: 58 BPM | RESPIRATION RATE: 18 BRPM | OXYGEN SATURATION: 96 % | SYSTOLIC BLOOD PRESSURE: 138 MMHG | TEMPERATURE: 98 F | DIASTOLIC BLOOD PRESSURE: 79 MMHG

## 2019-01-04 PROBLEM — E78.5 HYPERLIPIDEMIA, UNSPECIFIED: Chronic | Status: ACTIVE | Noted: 2018-12-26

## 2019-01-04 PROBLEM — N40.0 BENIGN PROSTATIC HYPERPLASIA WITHOUT LOWER URINARY TRACT SYMPTOMS: Chronic | Status: ACTIVE | Noted: 2018-12-26

## 2019-01-04 PROBLEM — R01.1 CARDIAC MURMUR, UNSPECIFIED: Chronic | Status: ACTIVE | Noted: 2018-12-26

## 2019-01-04 PROBLEM — C18.9 MALIGNANT NEOPLASM OF COLON, UNSPECIFIED: Chronic | Status: ACTIVE | Noted: 2018-12-26

## 2019-01-04 PROBLEM — H18.51 ENDOTHELIAL CORNEAL DYSTROPHY: Chronic | Status: ACTIVE | Noted: 2018-12-26

## 2019-01-04 LAB
ANION GAP SERPL CALC-SCNC: 11 MMOL/L — SIGNIFICANT CHANGE UP (ref 5–17)
BUN SERPL-MCNC: 17 MG/DL — SIGNIFICANT CHANGE UP (ref 7–23)
CALCIUM SERPL-MCNC: 9.1 MG/DL — SIGNIFICANT CHANGE UP (ref 8.4–10.5)
CHLORIDE SERPL-SCNC: 106 MMOL/L — SIGNIFICANT CHANGE UP (ref 96–108)
CO2 SERPL-SCNC: 23 MMOL/L — SIGNIFICANT CHANGE UP (ref 22–31)
CREAT SERPL-MCNC: 1.1 MG/DL — SIGNIFICANT CHANGE UP (ref 0.5–1.3)
GLUCOSE SERPL-MCNC: 111 MG/DL — HIGH (ref 70–99)
HCT VFR BLD CALC: 35.1 % — LOW (ref 39–50)
HGB BLD-MCNC: 11.6 G/DL — LOW (ref 13–17)
MCHC RBC-ENTMCNC: 28.9 PG — SIGNIFICANT CHANGE UP (ref 27–34)
MCHC RBC-ENTMCNC: 33.1 GM/DL — SIGNIFICANT CHANGE UP (ref 32–36)
MCV RBC AUTO: 87.4 FL — SIGNIFICANT CHANGE UP (ref 80–100)
PLATELET # BLD AUTO: 149 K/UL — LOW (ref 150–400)
POTASSIUM SERPL-MCNC: 3.9 MMOL/L — SIGNIFICANT CHANGE UP (ref 3.5–5.3)
POTASSIUM SERPL-SCNC: 3.9 MMOL/L — SIGNIFICANT CHANGE UP (ref 3.5–5.3)
RBC # BLD: 4.02 M/UL — LOW (ref 4.2–5.8)
RBC # FLD: 13 % — SIGNIFICANT CHANGE UP (ref 10.3–14.5)
SODIUM SERPL-SCNC: 140 MMOL/L — SIGNIFICANT CHANGE UP (ref 135–145)
SURGICAL PATHOLOGY STUDY: SIGNIFICANT CHANGE UP
WBC # BLD: 10.9 K/UL — HIGH (ref 3.8–10.5)
WBC # FLD AUTO: 10.9 K/UL — HIGH (ref 3.8–10.5)

## 2019-01-04 PROCEDURE — 80048 BASIC METABOLIC PNL TOTAL CA: CPT

## 2019-01-04 PROCEDURE — 88305 TISSUE EXAM BY PATHOLOGIST: CPT

## 2019-01-04 PROCEDURE — 86901 BLOOD TYPING SEROLOGIC RH(D): CPT

## 2019-01-04 PROCEDURE — 85027 COMPLETE CBC AUTOMATED: CPT

## 2019-01-04 PROCEDURE — 52234 CYSTOSCOPY AND TREATMENT: CPT | Mod: XS

## 2019-01-04 PROCEDURE — C1889: CPT

## 2019-01-04 PROCEDURE — 86900 BLOOD TYPING SEROLOGIC ABO: CPT

## 2019-01-04 PROCEDURE — 52649 PROSTATE LASER ENUCLEATION: CPT

## 2019-01-04 PROCEDURE — C1782: CPT

## 2019-01-04 RX ORDER — SENNA PLUS 8.6 MG/1
2 TABLET ORAL
Qty: 0 | Refills: 0 | COMMUNITY
Start: 2019-01-04

## 2019-01-04 RX ORDER — CEPHALEXIN 500 MG
1 CAPSULE ORAL
Qty: 20 | Refills: 0 | OUTPATIENT
Start: 2019-01-04 | End: 2019-01-13

## 2019-01-04 RX ORDER — LANOLIN ALCOHOL/MO/W.PET/CERES
5 CREAM (GRAM) TOPICAL AT BEDTIME
Qty: 0 | Refills: 0 | Status: DISCONTINUED | OUTPATIENT
Start: 2019-01-03 | End: 2019-01-04

## 2019-01-04 RX ORDER — DOCUSATE SODIUM 100 MG
1 CAPSULE ORAL
Qty: 0 | Refills: 0 | COMMUNITY
Start: 2019-01-04

## 2019-01-04 RX ADMIN — Medication 10 MILLIGRAM(S): at 06:10

## 2019-01-04 RX ADMIN — HEPARIN SODIUM 5000 UNIT(S): 5000 INJECTION INTRAVENOUS; SUBCUTANEOUS at 06:11

## 2019-01-04 RX ADMIN — SODIUM CHLORIDE 100 MILLILITER(S): 9 INJECTION, SOLUTION INTRAVENOUS at 09:02

## 2019-01-04 RX ADMIN — Medication 5 MILLIGRAM(S): at 00:09

## 2019-01-04 RX ADMIN — Medication 100 MILLIGRAM(S): at 06:10

## 2019-01-04 NOTE — DISCHARGE NOTE ADULT - PLAN OF CARE
you had a laser enucleation of the prostate Call the office if you experience fever, chills, uncontrolled pain, the inability to tolerate liquids, or the urine does not flow   to promote wound healing do not take a bath, continue to walk frequently, return to daily living activities slowly, no heavy lifting greater than 10lbs for 4-6 weeks, follow up Dr Warren in two weeks maintain adequate cholesterol continue home medication

## 2019-01-04 NOTE — PROGRESS NOTE ADULT - SUBJECTIVE AND OBJECTIVE BOX
Post op Check    Pt seen and examined without complaints. Pain is controlled. Denies SOB/CP/N/V.     Vital Signs Last 24 Hrs  T(C): 36.4 (03 Jan 2019 15:06), Max: 36.5 (03 Jan 2019 06:54)  T(F): 97.6 (03 Jan 2019 15:06), Max: 97.7 (03 Jan 2019 06:54)  HR: 58 (03 Jan 2019 15:06) (55 - 95)  BP: 132/75 (03 Jan 2019 15:06) (108/53 - 150/83)  BP(mean): 87 (03 Jan 2019 12:45) (76 - 96)  RR: 17 (03 Jan 2019 15:06) (14 - 17)  SpO2: 98% (03 Jan 2019 15:06) (95% - 100%)    I&O's Summary    03 Jan 2019 07:01  -  03 Jan 2019 15:18  --------------------------------------------------------  IN: 720 mL / OUT: 0 mL / NET: 720 mL        Physical Exam  Gen: awake alert NAD AXOX3  Pulm: No respiratory distress, no subcostal retractions  CV: RRR, no JVD  Abd: Soft, NT, ND  Back: no cvat bl   :  urine light pink on slow cbi                           11.2   7.2   )-----------( 122      ( 03 Jan 2019 11:26 )             33.5       01-03    140  |  108  |  20  ----------------------------<  104<H>  3.9   |  22  |  1.01    Ca    8.9      03 Jan 2019 11:26
UROLOGY PA PROGRESS NOTE:     Subjective:  no acute events overnight, CBI clamped this am, about 2 hours ago    Objective:  Vital signs  T(F): , Max: 98.4 (01-04-19 @ 05:16)  HR: 58 (01-04-19 @ 05:16)  BP: 138/79 (01-04-19 @ 05:16)  SpO2: 96% (01-04-19 @ 05:16)  Wt(kg): --    Output     CBI overnight       Physical Exam:  Gen: NAD  Abd: soft, NT/ND  : +aguilar draining clear off CBI     Labs:  01-04  10.9  / 35.1  /1.10   01-03  7.2   / 33.5  /1.01                           11.6   10.9  )-----------( 149      ( 04 Jan 2019 06:09 )             35.1     01-04    140  |  106  |  17  ----------------------------<  111<H>  3.9   |  23  |  1.10    Ca    9.1      04 Jan 2019 06:09

## 2019-01-04 NOTE — DISCHARGE NOTE ADULT - HOSPITAL COURSE
83 yo m with a pmh of bph with obstruction who arrived at [facility] on 1/3/19 for a laser enucleation of the prostate. post op he was placed on cbi. it was weaned on pod 1 and he passed a tov. his labs and vitals were stable and he was dcd home

## 2019-01-04 NOTE — DISCHARGE NOTE ADULT - CARE PLAN
Principal Discharge DX:	Benign prostatic hyperplasia with urinary obstruction  Goal:	you had a laser enucleation of the prostate  Assessment and plan of treatment:	Call the office if you experience fever, chills, uncontrolled pain, the inability to tolerate liquids, or the urine does not flow   to promote wound healing do not take a bath, continue to walk frequently, return to daily living activities slowly, no heavy lifting greater than 10lbs for 4-6 weeks, follow up Dr Warren in two weeks  Secondary Diagnosis:	Pure hypercholesterolemia  Goal:	maintain adequate cholesterol  Assessment and plan of treatment:	continue home medication
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
## 2019-01-04 NOTE — DISCHARGE NOTE ADULT - CARE PROVIDER_API CALL
Kirsten Warren), Urology  [address] [place] [address]  Phone: [phone]  Fax: [phone]

## 2019-01-04 NOTE — DISCHARGE NOTE ADULT - MEDICATION SUMMARY - MEDICATIONS TO TAKE
I will START or STAY ON the medications listed below when I get home from the hospital:    finasteride 5 mg oral tablet  -- 1 tab(s) by mouth once a day (at bedtime)  -- Indication: For home medication     tamsulosin 0.4 mg oral capsule  -- 1 cap(s) by mouth once a day  -- Indication: For home medication     rosuvastatin 5 mg oral tablet  -- 1 tab(s) by mouth once a day  -- Indication: For home medication    Keflex 500 mg oral capsule  -- 1 cap(s) by mouth every 12 hours   -- Finish all this medication unless otherwise directed by prescriber.    -- Indication: For antibiotic prophylaxis     senna oral tablet  -- 2 tab(s) by mouth once a day (at bedtime), As needed, Constipation  -- Indication: For stool softener     docusate sodium 100 mg oral capsule  -- 1 cap(s) by mouth 3 times a day  -- Indication: For stool softener     CoQ10  -- 100 milligram(s) by mouth once a day  -- Indication: For home medication     prednisoLONE acetate ophthalmic  -- both eyes once a week -  Sundays  -- Indication: For home medication     Vitamin B6 100 mg oral tablet  -- 1 tab(s) by mouth once a day  -- Indication: For home medication     Vitamin D3  -- 25 microgram(s) by mouth once a day  -- Indication: For home medication     Vitamin B12 1000 mcg oral tablet  -- 1 tab(s) by mouth once a day  -- Indication: For home medication

## 2019-01-04 NOTE — DISCHARGE NOTE ADULT - PATIENT PORTAL LINK FT
You can access the [facility], offered by [facility], by registering with the following website: [email or web address]

## 2019-01-04 NOTE — PROGRESS NOTE ADULT - ASSESSMENT
A/P: 84y Male s/p THULEP   cbi until am  lasix in am   DVT prophylaxis/OOB  Incentive spirometry  Strict I&O's  Analgesia and antiemetics as needed  Diet  AM labs
A/P: 84y Male s/p THULEP , POD#1  jeff d/c'd for TOV  monitor UO/color  PVR  lasix in am   AM labs  d/c planning

## 2019-01-14 ENCOUNTER — APPOINTMENT (OUTPATIENT)
Dept: UROLOGY | Facility: CLINIC | Age: 84
End: 2019-01-14
Payer: MEDICARE

## 2019-01-14 VITALS
RESPIRATION RATE: 17 BRPM | DIASTOLIC BLOOD PRESSURE: 81 MMHG | SYSTOLIC BLOOD PRESSURE: 138 MMHG | TEMPERATURE: 98.2 F | BODY MASS INDEX: 23.84 KG/M2 | HEIGHT: 72 IN | WEIGHT: 176 LBS | HEART RATE: 62 BPM

## 2019-01-14 PROCEDURE — 99024 POSTOP FOLLOW-UP VISIT: CPT

## 2019-01-14 NOTE — HISTORY OF PRESENT ILLNESS
[FreeTextEntry1] : s/p HOLEP and TURBT on Silverio 3, 2019.\par path = 51grams benign prostate, benign bladder.\par \par PVR = 2cc\par \par No VERO, no UUI\par Good strong force of stream\par urine almost clear\par \par Plan:\par f/u Dr. Najera in Spring when he returns from [place]

## 2019-01-16 LAB
ALBUMIN SERPL ELPH-MCNC: 4 G/DL
ALP BLD-CCNC: 93 U/L
ALT SERPL-CCNC: 15 U/L
ANION GAP SERPL CALC-SCNC: 9 MMOL/L
AST SERPL-CCNC: 15 U/L
BILIRUB SERPL-MCNC: 0.4 MG/DL
BUN SERPL-MCNC: 24 MG/DL
CALCIUM SERPL-MCNC: 9.7 MG/DL
CHLORIDE SERPL-SCNC: 108 MMOL/L
CHOLEST SERPL-MCNC: 135 MG/DL
CHOLEST/HDLC SERPL: 3.5 RATIO
CO2 SERPL-SCNC: 27 MMOL/L
CREAT SERPL-MCNC: 1.15 MG/DL
GLUCOSE SERPL-MCNC: 88 MG/DL
HDLC SERPL-MCNC: 39 MG/DL
LDLC SERPL CALC-MCNC: 81 MG/DL
POTASSIUM SERPL-SCNC: 4.4 MMOL/L
PROT SERPL-MCNC: 6.5 G/DL
SODIUM SERPL-SCNC: 144 MMOL/L
TRIGL SERPL-MCNC: 73 MG/DL

## 2019-01-23 ENCOUNTER — APPOINTMENT (OUTPATIENT)
Dept: UROLOGY | Facility: CLINIC | Age: 84
End: 2019-01-23

## 2019-04-29 ENCOUNTER — APPOINTMENT (OUTPATIENT)
Dept: SURGICAL ONCOLOGY | Facility: HOSPITAL | Age: 84
End: 2019-04-29

## 2019-04-29 ENCOUNTER — OUTPATIENT (OUTPATIENT)
Dept: OUTPATIENT SERVICES | Facility: HOSPITAL | Age: 84
LOS: 1 days | End: 2019-04-29
Payer: MEDICARE

## 2019-04-29 DIAGNOSIS — Z98.49 CATARACT EXTRACTION STATUS, UNSPECIFIED EYE: Chronic | ICD-10-CM

## 2019-04-29 DIAGNOSIS — Z12.11 ENCOUNTER FOR SCREENING FOR MALIGNANT NEOPLASM OF COLON: ICD-10-CM

## 2019-04-29 DIAGNOSIS — Z90.49 ACQUIRED ABSENCE OF OTHER SPECIFIED PARTS OF DIGESTIVE TRACT: Chronic | ICD-10-CM

## 2019-04-29 DIAGNOSIS — Z98.890 OTHER SPECIFIED POSTPROCEDURAL STATES: Chronic | ICD-10-CM

## 2019-04-29 DIAGNOSIS — Z94.7 CORNEAL TRANSPLANT STATUS: Chronic | ICD-10-CM

## 2019-04-29 DIAGNOSIS — T14.8XXA OTHER INJURY OF UNSPECIFIED BODY REGION, INITIAL ENCOUNTER: Chronic | ICD-10-CM

## 2019-04-29 DIAGNOSIS — Z90.89 ACQUIRED ABSENCE OF OTHER ORGANS: Chronic | ICD-10-CM

## 2019-04-29 PROCEDURE — 45378 DIAGNOSTIC COLONOSCOPY: CPT

## 2019-04-29 PROCEDURE — G0105: CPT

## 2019-04-30 ENCOUNTER — MEDICATION RENEWAL (OUTPATIENT)
Age: 84
End: 2019-04-30

## 2019-05-10 ENCOUNTER — APPOINTMENT (OUTPATIENT)
Dept: UROLOGY | Facility: CLINIC | Age: 84
End: 2019-05-10
Payer: MEDICARE

## 2019-05-10 DIAGNOSIS — N40.1 BENIGN PROSTATIC HYPERPLASIA WITH LOWER URINARY TRACT SYMPMS: ICD-10-CM

## 2019-05-10 DIAGNOSIS — N13.8 BENIGN PROSTATIC HYPERPLASIA WITH LOWER URINARY TRACT SYMPMS: ICD-10-CM

## 2019-05-10 PROCEDURE — 99213 OFFICE O/P EST LOW 20 MIN: CPT

## 2019-05-10 NOTE — ADDENDUM
[FreeTextEntry1] :  I, Augusta Hyman, acted solely as a scribe for Dr. Jason Kemp. The documentation recorded by the scribe accurately reflects the service I personally performed and the decision by me.\par

## 2019-05-10 NOTE — ASSESSMENT
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
## 2019-06-12 ENCOUNTER — APPOINTMENT (OUTPATIENT)
Dept: INTERNAL MEDICINE | Facility: CLINIC | Age: 84
End: 2019-06-12
Payer: MEDICARE

## 2019-06-12 VITALS — DIASTOLIC BLOOD PRESSURE: 80 MMHG | SYSTOLIC BLOOD PRESSURE: 132 MMHG

## 2019-06-12 VITALS
HEIGHT: 72 IN | OXYGEN SATURATION: 98 % | TEMPERATURE: 98 F | HEART RATE: 63 BPM | DIASTOLIC BLOOD PRESSURE: 93 MMHG | SYSTOLIC BLOOD PRESSURE: 156 MMHG | WEIGHT: 186 LBS | RESPIRATION RATE: 17 BRPM | BODY MASS INDEX: 25.19 KG/M2

## 2019-06-12 DIAGNOSIS — E78.00 PURE HYPERCHOLESTEROLEMIA, UNSPECIFIED: ICD-10-CM

## 2019-06-12 PROCEDURE — 99213 OFFICE O/P EST LOW 20 MIN: CPT

## 2019-06-12 RX ORDER — POTASSIUM CHLORIDE 1500 MG/1
20 TABLET, FILM COATED, EXTENDED RELEASE ORAL AS DIRECTED
Qty: 4 | Refills: 0 | Status: DISCONTINUED | COMMUNITY
Start: 2019-04-08 | End: 2019-06-12

## 2019-06-12 RX ORDER — TAMSULOSIN HYDROCHLORIDE 0.4 MG/1
0.4 CAPSULE ORAL
Qty: 30 | Refills: 0 | Status: DISCONTINUED | COMMUNITY
Start: 2018-12-19 | End: 2019-06-12

## 2019-06-12 RX ORDER — SODIUM PICOSULFATE, MAGNESIUM OXIDE, AND ANHYDROUS CITRIC ACID 10; 3.5; 12 MG/160ML; G/160ML; G/160ML
10-3.5-12 MG-GM LIQUID ORAL
Qty: 1 | Refills: 0 | Status: DISCONTINUED | COMMUNITY
Start: 2019-04-08 | End: 2019-06-12

## 2019-06-13 ENCOUNTER — RX RENEWAL (OUTPATIENT)
Age: 84
End: 2019-06-13

## 2019-06-13 LAB
ALBUMIN SERPL ELPH-MCNC: 4.2 G/DL
ALP BLD-CCNC: 77 U/L
ALT SERPL-CCNC: 14 U/L
ANION GAP SERPL CALC-SCNC: 10 MMOL/L
AST SERPL-CCNC: 14 U/L
BILIRUB SERPL-MCNC: 0.4 MG/DL
BUN SERPL-MCNC: 23 MG/DL
CALCIUM SERPL-MCNC: 9.6 MG/DL
CHLORIDE SERPL-SCNC: 107 MMOL/L
CHOLEST SERPL-MCNC: 142 MG/DL
CHOLEST/HDLC SERPL: 3.5 RATIO
CO2 SERPL-SCNC: 26 MMOL/L
CREAT SERPL-MCNC: 1.17 MG/DL
GLUCOSE SERPL-MCNC: 86 MG/DL
HDLC SERPL-MCNC: 41 MG/DL
LDLC SERPL CALC-MCNC: 88 MG/DL
POTASSIUM SERPL-SCNC: 4.3 MMOL/L
PROT SERPL-MCNC: 6.3 G/DL
SODIUM SERPL-SCNC: 143 MMOL/L
TRIGL SERPL-MCNC: 67 MG/DL

## 2019-06-13 RX ORDER — ROSUVASTATIN CALCIUM 5 MG/1
5 TABLET, FILM COATED ORAL
Qty: 90 | Refills: 0 | Status: ACTIVE | COMMUNITY
Start: 2018-06-22 | End: 1900-01-01

## 2019-08-27 NOTE — PATIENT PROFILE ADULT - HAS THE PATIENT BEEN ADMITTED FROM SHORT TERM REHAB?
Fever in Children    WHAT YOU NEED TO KNOW:    A fever is an increase in your child's body temperature. Normal body temperature is 98.6°F (37°C). Fever is generally defined as greater than 100.4°F (38°C). A fever is usually a sign that your child's body is fighting an infection caused by a virus. The cause of your child's fever may not be known. A fever can be serious in young children.    DISCHARGE INSTRUCTIONS:    Seek care immediately if:    Your child's temperature reaches 105°F (40.6°C).    Your child has a dry mouth, cracked lips, or cries without tears.     Your baby has a dry diaper for at least 8 hours, or he or she is urinating less than usual.    Your child is less alert, less active, or is acting differently than he or she usually does.    Your child has a seizure or has abnormal movements of the face, arms, or legs.    Your child is drooling and not able to swallow.    Your child has a stiff neck, severe headache, confusion, or is difficult to wake.    Your child has a fever for longer than 5 days.    Your child is crying or irritable and cannot be soothed.    Contact your child's healthcare provider if:    Your child's ear or forehead temperature is higher than 100.4°F (38°C).    Your child's oral or pacifier temperature is higher than 100°F (37.8°C).    Your child's armpit temperature is higher than 99°F (37.2°C).    Your child's fever lasts longer than 3 days.    You have questions or concerns about your child's fever.    Medicines: Your child may need any of the following:    Acetaminophen decreases pain and fever. It is available without a doctor's order. Ask how much to give your child and how often to give it. Follow directions. Read the labels of all other medicines your child uses to see if they also contain acetaminophen, or ask your child's doctor or pharmacist. Acetaminophen can cause liver damage if not taken correctly.    NSAIDs, such as ibuprofen, help decrease swelling, pain, and fever. This medicine is available with or without a doctor's order. NSAIDs can cause stomach bleeding or kidney problems in certain people. If your child takes blood thinner medicine, always ask if NSAIDs are safe for him. Always read the medicine label and follow directions. Do not give these medicines to children under 6 months of age without direction from your child's healthcare provider.    Do not give aspirin to children under 18 years of age. Your child could develop Reye syndrome if he takes aspirin. Reye syndrome can cause life-threatening brain and liver damage. Check your child's medicine labels for aspirin, salicylates, or oil of wintergreen.    Give your child's medicine as directed. Contact your child's healthcare provider if you think the medicine is not working as expected. Tell him or her if your child is allergic to any medicine. Keep a current list of the medicines, vitamins, and herbs your child takes. Include the amounts, and when, how, and why they are taken. Bring the list or the medicines in their containers to follow-up visits. Carry your child's medicine list with you in case of an emergency.    Temperature that is a fever in children:    An ear or forehead temperature of 100.4°F (38°C) or higher    An oral or pacifier temperature of 100°F (37.8°C) or higher    An armpit temperature of 99°F (37.2°C) or higher    The best way to take your child's temperature: The following are guidelines based on a child's age. Ask your child's healthcare provider about the best way to take your child's temperature.    If your baby is 3 months or younger, take the temperature in his or her armpit.    If your child is 3 months to 5 years, use an electronic pacifier temperature, depending on his or her age. After age 6 months, you can also take an ear, armpit, or forehead temperature.    If your child is 5 years or older, take an oral, ear, or forehead temperature.    Make your child more comfortable while he or she has a fever:    Give your child more liquids as directed. A fever makes your child sweat. This can increase his or her risk for dehydration. Liquids can help prevent dehydration.  Help your child drink at least 6 to 8 eight-ounce cups of clear liquids each day. Give your child water, juice, or broth. Do not give sports drinks to babies or toddlers.    Ask your child's healthcare provider if you should give your child an oral rehydration solution (ORS) to drink. An ORS has the right amounts of water, salts, and sugar your child needs to replace body fluids.    If you are breastfeeding or feeding your child formula, continue to do so. Your baby may not feel like drinking his or her regular amounts with each feeding. If so, feed him or her smaller amounts more often.    Dress your child in lightweight clothes. Shivers may be a sign that your child's fever is rising. Do not put extra blankets or clothes on him or her. This may cause his or her fever to rise even higher. Dress your child in light, comfortable clothing. Cover him or her with a lightweight blanket or sheet. Change your child's clothes, blanket, or sheets if they get wet.    Cool your child safely. Use a cool compress or give your child a bath in cool or lukewarm water. Your child's fever may not go down right away after his or her bath. Wait 30 minutes and check his or her temperature again. Do not put your child in a cold water or ice bath.    Follow up with your child's healthcare provider as directed: Write down your questions so you remember to ask them during your child's visits. no

## 2019-09-27 ENCOUNTER — MEDICATION RENEWAL (OUTPATIENT)
Age: 84
End: 2019-09-27

## 2019-09-30 ENCOUNTER — APPOINTMENT (OUTPATIENT)
Dept: INTERNAL MEDICINE | Facility: CLINIC | Age: 84
End: 2019-09-30

## 2020-12-16 PROBLEM — Z12.11 ENCOUNTER FOR SCREENING COLONOSCOPY: Status: RESOLVED | Noted: 2018-12-17 | Resolved: 2020-12-16

## 2021-11-22 NOTE — PRE-ANESTHESIA EVALUATION ADULT - NSANTHTOTALSCORECAL_ENT_A_CORE
3 Positioning (Leave Blank If You Do Not Want): The patient was placed in a comfortable position exposing the surgical site.

## 2023-04-28 ENCOUNTER — APPOINTMENT (OUTPATIENT)
Dept: UROLOGY | Facility: CLINIC | Age: 88
End: 2023-04-28

## 2023-05-16 NOTE — H&P PST ADULT - LAST STRESS TEST
Received call from pt's wife explaining pt is experiencing hypotension (80s systolic) at home and was wanting him to receive IV fluids today in hopes he'll still be able to receive his chemo tomorrow. This was reviewed with María Elena Fuller NP who is seeing the pt tomorrow- she was fine with Jose coming in today for fluids. Will have labs drawn today as well.   
denies

## 2025-09-15 ENCOUNTER — APPOINTMENT (OUTPATIENT)
Dept: HEMATOLOGY ONCOLOGY | Facility: CLINIC | Age: OVER 89
End: 2025-09-15

## 2025-09-17 ENCOUNTER — APPOINTMENT (OUTPATIENT)
Dept: INFUSION THERAPY | Facility: HOSPITAL | Age: OVER 89
End: 2025-09-17